# Patient Record
Sex: FEMALE | Race: WHITE | Employment: UNEMPLOYED | URBAN - METROPOLITAN AREA
[De-identification: names, ages, dates, MRNs, and addresses within clinical notes are randomized per-mention and may not be internally consistent; named-entity substitution may affect disease eponyms.]

---
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
## 2023-06-26 ENCOUNTER — HOSPITAL ENCOUNTER (OUTPATIENT)
Dept: ULTRASOUND IMAGING | Age: 18
Discharge: HOME | End: 2023-06-26
Payer: COMMERCIAL

## 2023-06-26 DIAGNOSIS — N63.11 MASS OF UPPER OUTER QUADRANT OF RIGHT BREAST: ICD-10-CM

## 2023-06-26 PROCEDURE — 76642 ULTRASOUND BREAST LIMITED: CPT

## 2023-06-27 ENCOUNTER — HOSPITAL ENCOUNTER (OUTPATIENT)
Dept: WOMENS IMAGING | Age: 18
Discharge: HOME | End: 2023-06-27
Payer: COMMERCIAL

## 2023-06-27 ENCOUNTER — HOSPITAL ENCOUNTER (OUTPATIENT)
Dept: WOMENS IMAGING | Age: 18
End: 2023-06-27
Payer: COMMERCIAL

## 2023-06-27 DIAGNOSIS — N63.11 MASS OF UPPER OUTER QUADRANT OF RIGHT BREAST: ICD-10-CM

## 2023-06-27 PROCEDURE — 19083 BX BREAST 1ST LESION US IMAG: CPT

## 2023-06-27 PROCEDURE — 88305 TISSUE EXAM BY PATHOLOGIST: CPT

## 2023-06-28 ENCOUNTER — TELEPHONE (OUTPATIENT)
Dept: WOMENS IMAGING | Age: 18
End: 2023-06-28

## 2024-10-26 ENCOUNTER — HOSPITAL ENCOUNTER (EMERGENCY)
Age: 19
Discharge: HOME | End: 2024-10-26
Payer: COMMERCIAL

## 2024-10-26 VITALS
WEIGHT: 136.2 LBS | DIASTOLIC BLOOD PRESSURE: 79 MMHG | HEART RATE: 76 BPM | RESPIRATION RATE: 16 BRPM | HEIGHT: 63 IN | TEMPERATURE: 98.8 F | SYSTOLIC BLOOD PRESSURE: 134 MMHG | OXYGEN SATURATION: 99 % | BODY MASS INDEX: 24.13 KG/M2

## 2024-10-26 DIAGNOSIS — Z34.90 EARLY STAGE OF PREGNANCY: Primary | ICD-10-CM

## 2024-10-26 LAB
ALBUMIN SERPL-MCNC: 5 G/DL (ref 3.4–5)
ALBUMIN/GLOB SERPL: 1.9 {RATIO} (ref 1.1–2.2)
ALP SERPL-CCNC: 50 U/L (ref 40–129)
ALT SERPL-CCNC: 11 U/L (ref 10–40)
ANION GAP SERPL CALCULATED.3IONS-SCNC: 15 MMOL/L (ref 3–16)
AST SERPL-CCNC: 21 U/L (ref 15–37)
B-HCG SERPL EIA 3RD IS-ACNC: 171 MIU/ML
BASOPHILS # BLD: 0.1 K/UL (ref 0–0.2)
BASOPHILS NFR BLD: 0.6 %
BILIRUB SERPL-MCNC: 0.5 MG/DL (ref 0–1)
BILIRUB UR QL STRIP.AUTO: NEGATIVE
BUN SERPL-MCNC: 9 MG/DL (ref 7–20)
CALCIUM SERPL-MCNC: 9.6 MG/DL (ref 8.3–10.6)
CHLORIDE SERPL-SCNC: 102 MMOL/L (ref 99–110)
CLARITY UR: CLEAR
CO2 SERPL-SCNC: 22 MMOL/L (ref 21–32)
COLOR UR: YELLOW
CREAT SERPL-MCNC: 0.7 MG/DL (ref 0.6–1.1)
DEPRECATED RDW RBC AUTO: 13.1 % (ref 12.4–15.4)
EOSINOPHIL # BLD: 0.1 K/UL (ref 0–0.6)
EOSINOPHIL NFR BLD: 0.9 %
GFR SERPLBLD CREATININE-BSD FMLA CKD-EPI: >90 ML/MIN/{1.73_M2}
GLUCOSE SERPL-MCNC: 84 MG/DL (ref 70–99)
GLUCOSE UR STRIP.AUTO-MCNC: NEGATIVE MG/DL
HCG UR QL: POSITIVE
HCT VFR BLD AUTO: 40.1 % (ref 36–48)
HGB BLD-MCNC: 13.3 G/DL (ref 12–16)
HGB UR QL STRIP.AUTO: NEGATIVE
KETONES UR STRIP.AUTO-MCNC: NEGATIVE MG/DL
LEUKOCYTE ESTERASE UR QL STRIP.AUTO: NEGATIVE
LYMPHOCYTES # BLD: 2.7 K/UL (ref 1–5.1)
LYMPHOCYTES NFR BLD: 25.5 %
MAGNESIUM SERPL-MCNC: 2.16 MG/DL (ref 1.8–2.4)
MCH RBC QN AUTO: 30.8 PG (ref 26–34)
MCHC RBC AUTO-ENTMCNC: 33.2 G/DL (ref 31–36)
MCV RBC AUTO: 92.8 FL (ref 80–100)
MONOCYTES # BLD: 0.8 K/UL (ref 0–1.3)
MONOCYTES NFR BLD: 7.4 %
NEUTROPHILS # BLD: 7 K/UL (ref 1.7–7.7)
NEUTROPHILS NFR BLD: 65.6 %
NITRITE UR QL STRIP.AUTO: NEGATIVE
PH UR STRIP.AUTO: 6.5 [PH] (ref 5–8)
PLATELET # BLD AUTO: 285 K/UL (ref 135–450)
PMV BLD AUTO: 8.2 FL (ref 5–10.5)
POTASSIUM SERPL-SCNC: 3.5 MMOL/L (ref 3.5–5.1)
PROT SERPL-MCNC: 7.7 G/DL (ref 6.4–8.2)
PROT UR STRIP.AUTO-MCNC: NEGATIVE MG/DL
RBC # BLD AUTO: 4.32 M/UL (ref 4–5.2)
SODIUM SERPL-SCNC: 139 MMOL/L (ref 136–145)
SP GR UR STRIP.AUTO: 1.02 (ref 1–1.03)
UA COMPLETE W REFLEX CULTURE PNL UR: NORMAL
UA DIPSTICK W REFLEX MICRO PNL UR: NORMAL
URN SPEC COLLECT METH UR: NORMAL
UROBILINOGEN UR STRIP-ACNC: 0.2 E.U./DL
WBC # BLD AUTO: 10.6 K/UL (ref 4–11)

## 2024-10-26 PROCEDURE — 80053 COMPREHEN METABOLIC PANEL: CPT

## 2024-10-26 PROCEDURE — 84703 CHORIONIC GONADOTROPIN ASSAY: CPT

## 2024-10-26 PROCEDURE — 85025 COMPLETE CBC W/AUTO DIFF WBC: CPT

## 2024-10-26 PROCEDURE — 81003 URINALYSIS AUTO W/O SCOPE: CPT

## 2024-10-26 PROCEDURE — 83735 ASSAY OF MAGNESIUM: CPT

## 2024-10-26 PROCEDURE — 36415 COLL VENOUS BLD VENIPUNCTURE: CPT

## 2024-10-26 PROCEDURE — 99283 EMERGENCY DEPT VISIT LOW MDM: CPT

## 2024-10-26 PROCEDURE — 84702 CHORIONIC GONADOTROPIN TEST: CPT

## 2024-10-26 ASSESSMENT — PAIN DESCRIPTION - DESCRIPTORS: DESCRIPTORS: CRAMPING

## 2024-10-26 ASSESSMENT — PAIN SCALES - GENERAL: PAINLEVEL_OUTOF10: 2

## 2024-10-26 ASSESSMENT — PAIN - FUNCTIONAL ASSESSMENT: PAIN_FUNCTIONAL_ASSESSMENT: 0-10

## 2024-10-26 ASSESSMENT — PAIN DESCRIPTION - ORIENTATION: ORIENTATION: OTHER (COMMENT)

## 2024-10-26 ASSESSMENT — PAIN DESCRIPTION - LOCATION: LOCATION: ABDOMEN

## 2024-10-26 NOTE — DISCHARGE INSTRUCTIONS
Please follow-up with your OB/GYN for further evaluation and treatment.  Your hCG today was 171, this indicates fairly early on in pregnancy.  This could be anywhere from 2 to 4 weeks.    If you develop any abdominal pain or significant vaginal bleeding please return the emergency department.

## 2024-10-26 NOTE — ED PROVIDER NOTES
[facility]  ED  EMERGENCY DEPARTMENT ENCOUNTER        Pt Name: Ana Ackerman  MRN: [id number]  Birthdate 2005  Date of evaluation: 10/26/2024  Provider: FANG Valdez  PCP: Karol Robertson APRN - NP  Note Started: 5:32 PM EDT 10/26/24      MARIZA. I have evaluated this patient.        CHIEF COMPLAINT       Chief Complaint   Patient presents with    Abdominal Pain     \"Slight cramping\" beginning this morning. States preg test positive a few days go. Denies any further symptoms       HISTORY OF PRESENT ILLNESS: 1 or more Elements     History from : Patient    Limitations to history : None    Ana Ackerman is a 19 y.o. female who presents to the emergency department complaining of abdominal pain.  Patient states she has had some slight abdominal cramping over the past several days.  She took a pregnancy test 2 days ago and it was positive.  She denies any abdominal pain at this time.  She has not had any vaginal bleeding.  No dysuria or hematuria.  She states she was on the Nexplanon birth control until July.  She did not have a normal menstrual cycle after having that removed.  She states she had a little bit of vaginal spotting in September but states it was not quite as heavy as her normal menstrual cycle.  Denies any fevers or chills.  No nausea or vomiting.  No dysuria or hematuria.  No diarrhea.  She is here requesting an ultrasound.  She is currently taking a prenatal vitamin.    Nursing Notes were all reviewed and agreed with or any disagreements were addressed in the HPI.    REVIEW OF SYSTEMS :      Review of Systems    Positives and Pertinent negatives as per HPI.     SURGICAL HISTORY     Past Surgical History:   Procedure Laterality Date    TONSILLECTOMY      TYMPANOSTOMY TUBE PLACEMENT      US BREAST BIOPSY W LOC DEVICE 1ST LESION RIGHT Right 6/27/2023    US BREAST BIOPSY W LOC DEVICE 1ST LESION RIGHT 6/27/2023 Taco Cross MD [facility]

## 2024-10-28 ENCOUNTER — HOSPITAL ENCOUNTER (OUTPATIENT)
Age: 19
Discharge: HOME | End: 2024-10-28
Payer: COMMERCIAL

## 2024-10-28 DIAGNOSIS — O36.80X0 PREGNANCY OF UNKNOWN ANATOMIC LOCATION: Primary | ICD-10-CM

## 2024-10-28 LAB — B-HCG SERPL EIA 3RD IS-ACNC: 484.6 MIU/ML

## 2024-10-28 PROCEDURE — 84702 CHORIONIC GONADOTROPIN TEST: CPT

## 2024-10-28 PROCEDURE — 36415 COLL VENOUS BLD VENIPUNCTURE: CPT

## 2024-10-31 LAB
ABO, EXTERNAL RESULT: NORMAL
HEP B, EXTERNAL RESULT: NEGATIVE
HIV, EXTERNAL RESULT: NEGATIVE
RH FACTOR, EXTERNAL RESULT: POSITIVE
RPR, EXTERNAL RESULT: NONREACTIVE
RUBELLA TITER, EXTERNAL RESULT: NORMAL

## 2024-11-27 LAB
C. TRACHOMATIS, EXTERNAL RESULT: NEGATIVE
N. GONORRHOEAE, EXTERNAL RESULT: NEGATIVE

## 2025-01-03 ENCOUNTER — HOSPITAL ENCOUNTER (EMERGENCY)
Age: 20
Discharge: HOME | End: 2025-01-03
Payer: COMMERCIAL

## 2025-01-03 VITALS
HEIGHT: 63 IN | DIASTOLIC BLOOD PRESSURE: 82 MMHG | TEMPERATURE: 98 F | WEIGHT: 143.25 LBS | RESPIRATION RATE: 16 BRPM | BODY MASS INDEX: 25.38 KG/M2 | HEART RATE: 89 BPM | OXYGEN SATURATION: 100 % | SYSTOLIC BLOOD PRESSURE: 127 MMHG

## 2025-01-03 DIAGNOSIS — Z3A.01 4 WEEKS GESTATION OF PREGNANCY: ICD-10-CM

## 2025-01-03 DIAGNOSIS — F41.1 ANXIETY STATE: Primary | ICD-10-CM

## 2025-01-03 LAB
BACTERIA URNS QL MICRO: ABNORMAL /HPF
BILIRUB UR QL STRIP.AUTO: NEGATIVE
CLARITY UR: CLEAR
COLOR UR: YELLOW
EPI CELLS #/AREA URNS HPF: ABNORMAL /HPF (ref 0–5)
GLUCOSE UR STRIP.AUTO-MCNC: NEGATIVE MG/DL
HGB UR QL STRIP.AUTO: NEGATIVE
KETONES UR STRIP.AUTO-MCNC: NEGATIVE MG/DL
LEUKOCYTE ESTERASE UR QL STRIP.AUTO: ABNORMAL
NITRITE UR QL STRIP.AUTO: NEGATIVE
PH UR STRIP.AUTO: 7 [PH] (ref 5–8)
PROT UR STRIP.AUTO-MCNC: NEGATIVE MG/DL
RBC #/AREA URNS HPF: ABNORMAL /HPF (ref 0–4)
SP GR UR STRIP.AUTO: 1.01 (ref 1–1.03)
UA COMPLETE W REFLEX CULTURE PNL UR: ABNORMAL
UA DIPSTICK W REFLEX MICRO PNL UR: YES
URN SPEC COLLECT METH UR: ABNORMAL
UROBILINOGEN UR STRIP-ACNC: 0.2 E.U./DL
WBC #/AREA URNS HPF: ABNORMAL /HPF (ref 0–5)

## 2025-01-03 PROCEDURE — 81003 URINALYSIS AUTO W/O SCOPE: CPT

## 2025-01-03 PROCEDURE — 99283 EMERGENCY DEPT VISIT LOW MDM: CPT

## 2025-01-03 PROCEDURE — 81001 URINALYSIS AUTO W/SCOPE: CPT

## 2025-01-03 RX ORDER — CEPHALEXIN 500 MG/1
500 CAPSULE ORAL 3 TIMES DAILY
Qty: 15 CAPSULE | Refills: 0 | Status: SHIPPED | OUTPATIENT
Start: 2025-01-03 | End: 2025-01-08

## 2025-01-03 ASSESSMENT — PAIN - FUNCTIONAL ASSESSMENT: PAIN_FUNCTIONAL_ASSESSMENT: NONE - DENIES PAIN

## 2025-01-04 NOTE — ED PROVIDER NOTES
[facility]  ED  EMERGENCY DEPARTMENT ENCOUNTER        Pt Name: Ana Ackerman  MRN: [id number]  Birthdate 2005  Date of evaluation: 1/3/2025  Provider: FANG Valdez  PCP: Karol Robertson APRN - NP  Note Started: 9:55 PM EST 1/3/25      MARIZA. I have evaluated this patient.        CHIEF COMPLAINT       Chief Complaint   Patient presents with    Other     States 14 weeks pregnant with first baby   Reports having an anxiety attack states \"I am just worried that my baby isn't ok\"  Denies pain, denies vaginal bleeding   States otherwise feels great with no complaints and no reasoning as to why baby would not be ok   Last ultrasound 6 weeks gestation, did have bleeding early pregnancy that has since resolved        HISTORY OF PRESENT ILLNESS: 1 or more Elements     History from : Patient    Limitations to history : None    Ana Ackerman is a 19 y.o. female who presents to the emergency department for anxiety.  Patient states she is 14 weeks pregnant, this is her first pregnancy.  She reports having an anxiety attack stating she is worried about her baby.  She denies any history of anxiety.  Denies any abdominal pain, dysuria, vaginal bleeding or discharge.  Her last ultrasound was 6 weeks gestation which confirmed pregnancy.  She follows with [facility].    Nursing Notes were all reviewed and agreed with or any disagreements were addressed in the HPI.    REVIEW OF SYSTEMS :      Review of Systems    Positives and Pertinent negatives as per HPI.     SURGICAL HISTORY     Past Surgical History:   Procedure Laterality Date    TONSILLECTOMY      TYMPANOSTOMY TUBE PLACEMENT      US BREAST BIOPSY W LOC DEVICE 1ST LESION RIGHT Right 6/27/2023    US BREAST BIOPSY W LOC DEVICE 1ST LESION RIGHT 6/27/2023 Taco Cross MD [facility]       CURRENTMEDICATIONS       Discharge Medication List as of 1/3/2025 10:01 PM          ALLERGIES     Patient has no known

## 2025-04-22 ENCOUNTER — HOSPITAL ENCOUNTER (OUTPATIENT)
Age: 20
Discharge: HOME | End: 2025-04-22
Attending: OBSTETRICS & GYNECOLOGY | Admitting: OBSTETRICS & GYNECOLOGY
Payer: COMMERCIAL

## 2025-04-22 VITALS
RESPIRATION RATE: 16 BRPM | OXYGEN SATURATION: 98 % | WEIGHT: 171.7 LBS | BODY MASS INDEX: 30.42 KG/M2 | HEART RATE: 100 BPM | TEMPERATURE: 98.1 F | SYSTOLIC BLOOD PRESSURE: 114 MMHG | DIASTOLIC BLOOD PRESSURE: 74 MMHG

## 2025-04-22 PROCEDURE — 99211 OFF/OP EST MAY X REQ PHY/QHP: CPT

## 2025-04-23 NOTE — PROGRESS NOTES
D/c instructions given. Pt verbalized understanding and denied questions. Pt left OB unit @ 2032 in stable condition, ambulatory and undelivered. Not in active labor.

## 2025-04-23 NOTE — PROGRESS NOTES
Pt presents to T1 with primary complaint of DFM since 1030 this morning. Pt attempted to reposition, drink water and juice throughout the day. Pt denies any contractions at this time. Pt denies LOF and vaginal bleeding. Pt placed on EFM and vital signs obtained and WNL. Kick counter given to pt. Will continue to monitor and notify Dr. Flores.

## 2025-04-23 NOTE — H&P
[facility]  Labor and Delivery Triage Note    PRENATAL PROVIDER GROUP: [facility]    PRESENTING HISTORY:  Ana Ackerman is a 20 y.o. y.o.  at 30w1d who presents with complaint of decreased fetal movement since this AM.  Usual method to stimulate baby did not work today.  No vaginal bleeding, no contractions, no leakage of fluid.     DATA:  /74   Pulse 100   Temp 98.1 °F (36.7 °C) (Oral)   Resp 16   Wt 77.9 kg (171 lb 11.2 oz)   LMP 2024 (Approximate)   SpO2 98%   BMI 30.42 kg/m²   CONSTITUTIONAL:  awake, alert, cooperative, no apparent distress, and appears stated age  LUNGS:  no increased work of breathing  ABDOMEN:  gravid, nontender    NST: reassuring for gestational age  Fetal heart rate:         Baseline Heart Rate:  135        Accelerations:  appropriate for gestational age       Decelerations:  none       Variability:  moderate    Contraction frequency: none    ASSESSMENT AND PLAN:       pregnancy with decreased fetal movement- resolved during NST.  Patient is reassured and does not desire additional evaluation    Condition: good    Disposition: home, follow up at next prenatal visit, continue fetal kick counts      Payton Flores MD  [facility]  P: [phone] ([place])  P: [phone] ([place])  P: [phone] (Chip Griffiths)  [email or web address]

## 2025-06-10 LAB — GBS, EXTERNAL RESULT: NEGATIVE

## 2025-06-14 ENCOUNTER — HOSPITAL ENCOUNTER (OUTPATIENT)
Age: 20
Discharge: HOME | End: 2025-06-14
Attending: OBSTETRICS & GYNECOLOGY | Admitting: OBSTETRICS & GYNECOLOGY
Payer: COMMERCIAL

## 2025-06-14 VITALS
HEART RATE: 100 BPM | DIASTOLIC BLOOD PRESSURE: 73 MMHG | RESPIRATION RATE: 16 BRPM | TEMPERATURE: 98.1 F | WEIGHT: 180 LBS | SYSTOLIC BLOOD PRESSURE: 136 MMHG | OXYGEN SATURATION: 98 % | BODY MASS INDEX: 31.89 KG/M2

## 2025-06-14 DIAGNOSIS — R82.71 ASYMPTOMATIC BACTERIURIA DURING PREGNANCY IN THIRD TRIMESTER: Primary | ICD-10-CM

## 2025-06-14 DIAGNOSIS — O98.819 CANDIDIASIS OF VAGINA DURING PREGNANCY: ICD-10-CM

## 2025-06-14 DIAGNOSIS — B37.31 CANDIDIASIS OF VAGINA DURING PREGNANCY: ICD-10-CM

## 2025-06-14 DIAGNOSIS — O99.891 ASYMPTOMATIC BACTERIURIA DURING PREGNANCY IN THIRD TRIMESTER: Primary | ICD-10-CM

## 2025-06-14 PROBLEM — O36.8130 DECREASED FETAL MOVEMENT AFFECTING MANAGEMENT OF PREGNANCY IN THIRD TRIMESTER: Status: ACTIVE | Noted: 2025-06-14

## 2025-06-14 LAB
ALBUMIN SERPL-MCNC: 3.6 G/DL (ref 3.4–5)
ALBUMIN/GLOB SERPL: 1.3 {RATIO} (ref 1.1–2.2)
ALP SERPL-CCNC: 139 U/L (ref 40–129)
ALT SERPL-CCNC: 10 U/L (ref 10–40)
ANION GAP SERPL CALCULATED.3IONS-SCNC: 14 MMOL/L (ref 3–16)
AST SERPL-CCNC: 17 U/L (ref 15–37)
BACTERIA URNS QL MICRO: ABNORMAL /HPF
BILIRUB SERPL-MCNC: <0.2 MG/DL (ref 0–1)
BILIRUB UR QL STRIP.AUTO: NEGATIVE
BUN SERPL-MCNC: 6 MG/DL (ref 7–20)
CALCIUM SERPL-MCNC: 9.5 MG/DL (ref 8.3–10.6)
CHLORIDE SERPL-SCNC: 103 MMOL/L (ref 99–110)
CLARITY UR: ABNORMAL
CO2 SERPL-SCNC: 20 MMOL/L (ref 21–32)
COLOR UR: YELLOW
CREAT SERPL-MCNC: 0.5 MG/DL (ref 0.6–1.1)
DEPRECATED RDW RBC AUTO: 13.6 % (ref 12.4–15.4)
EPI CELLS #/AREA URNS HPF: ABNORMAL /HPF (ref 0–5)
GFR SERPLBLD CREATININE-BSD FMLA CKD-EPI: >90 ML/MIN/{1.73_M2}
GLUCOSE SERPL-MCNC: 131 MG/DL (ref 70–99)
GLUCOSE UR STRIP.AUTO-MCNC: NEGATIVE MG/DL
HCT VFR BLD AUTO: 36.4 % (ref 36–48)
HGB BLD-MCNC: 12.4 G/DL (ref 12–16)
HGB UR QL STRIP.AUTO: NEGATIVE
KETONES UR STRIP.AUTO-MCNC: 15 MG/DL
LEUKOCYTE ESTERASE UR QL STRIP.AUTO: ABNORMAL
MCH RBC QN AUTO: 30.1 PG (ref 26–34)
MCHC RBC AUTO-ENTMCNC: 34 G/DL (ref 31–36)
MCV RBC AUTO: 88.6 FL (ref 80–100)
NITRITE UR QL STRIP.AUTO: NEGATIVE
PH UR STRIP.AUTO: 7 [PH] (ref 5–8)
PLATELET # BLD AUTO: 185 K/UL (ref 135–450)
PMV BLD AUTO: 9.6 FL (ref 5–10.5)
POTASSIUM SERPL-SCNC: 3.5 MMOL/L (ref 3.5–5.1)
PROT SERPL-MCNC: 6.3 G/DL (ref 6.4–8.2)
PROT UR STRIP.AUTO-MCNC: NEGATIVE MG/DL
RBC # BLD AUTO: 4.11 M/UL (ref 4–5.2)
RBC #/AREA URNS HPF: ABNORMAL /HPF (ref 0–4)
SODIUM SERPL-SCNC: 137 MMOL/L (ref 136–145)
SP GR UR STRIP.AUTO: 1.01 (ref 1–1.03)
UA DIPSTICK W REFLEX MICRO PNL UR: YES
URN SPEC COLLECT METH UR: ABNORMAL
UROBILINOGEN UR STRIP-ACNC: 0.2 E.U./DL
WBC # BLD AUTO: 12.5 K/UL (ref 4–11)
WBC #/AREA URNS HPF: ABNORMAL /HPF (ref 0–5)

## 2025-06-14 PROCEDURE — 85027 COMPLETE CBC AUTOMATED: CPT

## 2025-06-14 PROCEDURE — 81001 URINALYSIS AUTO W/SCOPE: CPT

## 2025-06-14 PROCEDURE — 99212 OFFICE O/P EST SF 10 MIN: CPT

## 2025-06-14 PROCEDURE — 80053 COMPREHEN METABOLIC PANEL: CPT

## 2025-06-14 RX ORDER — TERCONAZOLE 4 MG/G
CREAM VAGINAL
Qty: 3 EACH | Refills: 0 | Status: SHIPPED | OUTPATIENT
Start: 2025-06-14 | End: 2025-06-21

## 2025-06-14 RX ORDER — NITROFURANTOIN 25; 75 MG/1; MG/1
100 CAPSULE ORAL 2 TIMES DAILY
Qty: 10 CAPSULE | Refills: 0 | Status: SHIPPED | OUTPATIENT
Start: 2025-06-14 | End: 2025-06-19

## 2025-06-14 NOTE — H&P
Department of Obstetrics and Gynecology  Labor and Delivery  Attending Triage Note      CHIEF COMPLAINT: decreased fetal movement    HPI:  The patient is a 20 year old female at 37 weeks 5 days EDC 6/3/25 presents for decreased fetal movement since yesterday.  Did not call office for evaluation, she states \"I thought maybe the baby was just having a quiet day\".  She felt the baby move 3 times around 4am, at breakfast at 730 but has not felt baby move. No vaginal bleeding, leakage of fluid or contractions. Receives prenatal care with [facility].       OB History          1    Para        Term                AB        Living    0         SAB        IAB        Ectopic        Molar        Multiple        Live Births               Vitals:  LMP 2024 (Approximate)     GEN:  awake, alert, cooperative, no apparent distress, and appears stated age  CV +S1,S2  Resp no labored breathing  ABD:  Soft, gravid, non tender  SVE: no pooling on valsalva, cervical ectropion, small bleed from trauma to cervix on exam from speculum, white/yellow curdy discharge concerning for vaginal candidiasis.    Cervix:  C/L/H    Fetal heart rate:         Baseline Heart Rate:  130 bpm        Accelerations:  present       Decelerations:  absent       Variability:  moderate    Contraction frequency: not cecy      ASSESSMENT & PLAN:    IUP at 37.5 weeks    Decreased Fetal movement  Vaginal candidiasis  Asymptomatic bacteriuria  Reassuring fetal status    NST reactive. FHR Baseline 130 bpm wnl. Now feeling good fetal  movement. FHT reassuring and reviewed at bedside with patient. She reports good active movement at this time.  Erx for Terconazole for candidiasis x3 qhs.    Encourage po hydration.  Erx for Macrobid BID x5d.    Okay for discharge home. Stable.  Follow up next week as scheduled on 25 for routine OB care. Call/return precautions reviewed.      Rebekah Alcala, DO  OB/GYN

## 2025-06-14 NOTE — PROGRESS NOTES
at 36.4 weeks gestation arrives in triage with report of decreased fetal activity.  Had an episode of nausea and vomiting yesterday with abdominal cramping.  Also reports that vaginal discharge has become greenish/yellowish.  EFM placed.  Patient instructed to use remote marker to indicate fetal movement.

## 2025-06-14 NOTE — DISCHARGE INSTRUCTIONS
Diet/Activity/Restrictions:  Regular  Limit caffeine consumption  Increase your fluid intake  Eat small meals-but often.  Rise from laying/sitting position to standing slowly.  Avoid laying on your back, sidelying positions are best.  Weigh yourself daily and write down what you weigh.  If you had a vaginal exam you may have some bloody mucous or brown vaginal discharge.  If your doctor/midwife has ordered antibiotics, get it filled right away and take all of the medication as it has been prescribed.    When to call your doctor:  If you have severe back pain.  Period-like cramps that may come and go.  May feel like baby is balling up.  Low, dull backache, not relieved by rest.  Pressure in your vagina or lower abdomen that may feel like the baby is pushing down.  Change in the type or amount of vaginal discharge.  Abdominal cramps that may be accompanied by diarrhea.  4-5 uterine contractions in one hour.  Fluid leaking from your vagina (may or may not be bloody)  If you have vaginal bleeding.  If you have a temperature of 100.6 or more.  If your baby has a decrease in activity.  Less than 5 times in an hour.  If you feel you are not getting better or you are concerned.  If you develop a headache, not resolved with your usual interventions.  If you have changes in your vision (blurring or spots in your vision).  If you have burning with urination, urgency or frequency.  If you have pain in your abdomen, up by your ribs.    General Instructions:    Nausea and Vomiting  Keep dry toast/crackers with you to munch on  Eat small frequent meals  Try to keep something in your stomach (don't let your stomach get empty)  Take your time getting up  Avoid smells that make you feel sick    Travel  Wear seatbelts or safety/lap belts  Walk frequently, every 1-2 hours  Wear clothing that does not constrict and comfortable shoes  Keep a light snack (e.g. Dry crackers) with you at all times to prevent nausea  Drink plenty of water,

## 2025-06-14 NOTE — PROGRESS NOTES
Patient presents to [facility] with c/o of decreased FM and one episode of vomiting/cramping.    She states when she woke up at 0430 the baby wasn't as active as he normally was. She moved around in an attempt to get him to move more and when she ate some potato chips she threw up with no further increase in fetal movement.    She was escorted to triage room 3 and asked to leave a urine sample if possible.     Upon assessment:    Patient states she has had no LOF or vaginal bleeding. She has had intermittent cramps but nothing that is persistent or too painful.  Patient has felt baby move every once in a while but not as often as normal.    To touch, abdomen is soft.    Dr. Alcala was updated and has ordered a UA, CBC and CMP.

## 2025-06-14 NOTE — PROGRESS NOTES
Pt verbalized understanding of verbal and written discharge instructions and denies having questions at this time. Pt left OB unit at 1349 ambulatory, undelivered, and in stable condition, accompanied by mom. Patient is not in active labor.

## 2025-06-30 ENCOUNTER — ANESTHESIA (OUTPATIENT)
Dept: LABOR AND DELIVERY | Age: 20
DRG: 560 | End: 2025-06-30
Payer: COMMERCIAL

## 2025-06-30 ENCOUNTER — HOSPITAL ENCOUNTER (INPATIENT)
Age: 20
LOS: 2 days | Discharge: HOME | DRG: 560 | End: 2025-07-02
Attending: OBSTETRICS & GYNECOLOGY | Admitting: OBSTETRICS & GYNECOLOGY
Payer: COMMERCIAL

## 2025-06-30 ENCOUNTER — ANESTHESIA EVENT (OUTPATIENT)
Dept: LABOR AND DELIVERY | Age: 20
DRG: 560 | End: 2025-06-30
Payer: COMMERCIAL

## 2025-06-30 LAB
ABO/RH: NORMAL
AMPHETAMINES UR QL SCN>1000 NG/ML: NORMAL
ANTIBODY SCREEN: NORMAL
BARBITURATES UR QL SCN>200 NG/ML: NORMAL
BASOPHILS # BLD: 0 K/UL (ref 0–0.2)
BASOPHILS NFR BLD: 0.4 %
BENZODIAZ UR QL SCN>200 NG/ML: NORMAL
BUPRENORPHINE+NOR UR QL SCN: NORMAL
CANNABINOIDS UR QL SCN>50 NG/ML: NORMAL
COCAINE UR QL SCN: NORMAL
DEPRECATED RDW RBC AUTO: 14.2 % (ref 12.4–15.4)
DRUG SCREEN COMMENT UR-IMP: NORMAL
EOSINOPHIL # BLD: 0 K/UL (ref 0–0.6)
EOSINOPHIL NFR BLD: 0.4 %
FENTANYL SCREEN, URINE: NORMAL
HGB BLD-MCNC: 12.4 G/DL (ref 12–16)
LYMPHOCYTES # BLD: 2.1 K/UL (ref 1–5.1)
LYMPHOCYTES NFR BLD: 16.2 %
MCH RBC QN AUTO: 30.6 PG (ref 26–34)
MCV RBC AUTO: 88.5 FL (ref 80–100)
METHADONE UR QL SCN>300 NG/ML: NORMAL
MONOCYTES # BLD: 0.8 K/UL (ref 0–1.3)
MONOCYTES NFR BLD: 6.1 %
NEUTROPHILS # BLD: 9.8 K/UL (ref 1.7–7.7)
NEUTROPHILS NFR BLD: 76.9 %
OPIATES UR QL SCN>300 NG/ML: NORMAL
OXYCODONE UR QL SCN: NORMAL
PCP UR QL SCN>25 NG/ML: NORMAL
PH UR STRIP: 7.5 [PH]
PMV BLD AUTO: 10.3 FL (ref 5–10.5)
RBC # BLD AUTO: 4.06 M/UL (ref 4–5.2)

## 2025-06-30 PROCEDURE — 86780 TREPONEMA PALLIDUM: CPT

## 2025-06-30 PROCEDURE — 85025 COMPLETE CBC W/AUTO DIFF WBC: CPT

## 2025-06-30 PROCEDURE — 2580000003 HC RX 258: Performed by: OBSTETRICS & GYNECOLOGY

## 2025-06-30 PROCEDURE — 80307 DRUG TEST PRSMV CHEM ANLYZR: CPT

## 2025-06-30 PROCEDURE — 2500000003 HC RX 250 WO HCPCS: Performed by: NURSE ANESTHETIST, CERTIFIED REGISTERED

## 2025-06-30 PROCEDURE — 86850 RBC ANTIBODY SCREEN: CPT

## 2025-06-30 PROCEDURE — 1220000000 HC SEMI PRIVATE OB R&B

## 2025-06-30 PROCEDURE — 6360000002 HC RX W HCPCS: Performed by: NURSE ANESTHETIST, CERTIFIED REGISTERED

## 2025-06-30 PROCEDURE — 86900 BLOOD TYPING SEROLOGIC ABO: CPT

## 2025-06-30 PROCEDURE — 6360000002 HC RX W HCPCS: Performed by: OBSTETRICS & GYNECOLOGY

## 2025-06-30 PROCEDURE — 86901 BLOOD TYPING SEROLOGIC RH(D): CPT

## 2025-06-30 RX ORDER — SODIUM CHLORIDE 9 MG/ML
25 INJECTION, SOLUTION INTRAVENOUS PRN
Status: DISCONTINUED | OUTPATIENT
Start: 2025-06-30 | End: 2025-07-01

## 2025-06-30 RX ORDER — SODIUM CHLORIDE 0.9 % (FLUSH) 0.9 %
5-40 SYRINGE (ML) INJECTION PRN
Status: DISCONTINUED | OUTPATIENT
Start: 2025-06-30 | End: 2025-07-01

## 2025-06-30 RX ORDER — BUPIVACAINE HYDROCHLORIDE 2.5 MG/ML
INJECTION, SOLUTION EPIDURAL; INFILTRATION; INTRACAUDAL; PERINEURAL
Status: DISCONTINUED | OUTPATIENT
Start: 2025-06-30 | End: 2025-07-01 | Stop reason: SDUPTHER

## 2025-06-30 RX ORDER — NALBUPHINE HYDROCHLORIDE 10 MG/ML
10 INJECTION INTRAMUSCULAR; INTRAVENOUS; SUBCUTANEOUS
Status: DISCONTINUED | OUTPATIENT
Start: 2025-06-30 | End: 2025-07-01

## 2025-06-30 RX ORDER — TRANEXAMIC ACID 10 MG/ML
1000 INJECTION, SOLUTION INTRAVENOUS
Status: DISCONTINUED | OUTPATIENT
Start: 2025-06-30 | End: 2025-07-01

## 2025-06-30 RX ORDER — METHYLERGONOVINE MALEATE 0.2 MG/ML
200 INJECTION INTRAVENOUS PRN
Status: DISCONTINUED | OUTPATIENT
Start: 2025-06-30 | End: 2025-07-01

## 2025-06-30 RX ORDER — SODIUM CHLORIDE, SODIUM LACTATE, POTASSIUM CHLORIDE, AND CALCIUM CHLORIDE .6; .31; .03; .02 G/100ML; G/100ML; G/100ML; G/100ML
500 INJECTION, SOLUTION INTRAVENOUS PRN
Status: DISCONTINUED | OUTPATIENT
Start: 2025-06-30 | End: 2025-07-01

## 2025-06-30 RX ORDER — CARBOPROST TROMETHAMINE 250 UG/ML
250 INJECTION, SOLUTION INTRAMUSCULAR PRN
Status: DISCONTINUED | OUTPATIENT
Start: 2025-06-30 | End: 2025-07-01

## 2025-06-30 RX ORDER — TERBUTALINE SULFATE 1 MG/ML
0.25 INJECTION SUBCUTANEOUS
Status: DISCONTINUED | OUTPATIENT
Start: 2025-06-30 | End: 2025-07-01

## 2025-06-30 RX ORDER — MISOPROSTOL 100 UG/1
800 TABLET ORAL PRN
Status: DISCONTINUED | OUTPATIENT
Start: 2025-06-30 | End: 2025-07-01

## 2025-06-30 RX ORDER — SODIUM CHLORIDE 0.9 % (FLUSH) 0.9 %
5-40 SYRINGE (ML) INJECTION EVERY 12 HOURS SCHEDULED
Status: DISCONTINUED | OUTPATIENT
Start: 2025-06-30 | End: 2025-07-01

## 2025-06-30 RX ORDER — ONDANSETRON 2 MG/ML
4 INJECTION INTRAMUSCULAR; INTRAVENOUS EVERY 6 HOURS PRN
Status: DISCONTINUED | OUTPATIENT
Start: 2025-06-30 | End: 2025-07-01

## 2025-06-30 RX ORDER — BUTORPHANOL TARTRATE 1 MG/ML
1 INJECTION, SOLUTION INTRAMUSCULAR; INTRAVENOUS
Status: DISCONTINUED | OUTPATIENT
Start: 2025-06-30 | End: 2025-07-01

## 2025-06-30 RX ORDER — SODIUM CHLORIDE, SODIUM LACTATE, POTASSIUM CHLORIDE, CALCIUM CHLORIDE 600; 310; 30; 20 MG/100ML; MG/100ML; MG/100ML; MG/100ML
INJECTION, SOLUTION INTRAVENOUS CONTINUOUS
Status: DISCONTINUED | OUTPATIENT
Start: 2025-06-30 | End: 2025-07-01

## 2025-06-30 RX ORDER — ONDANSETRON 4 MG/1
4 TABLET, ORALLY DISINTEGRATING ORAL EVERY 8 HOURS PRN
Status: DISCONTINUED | OUTPATIENT
Start: 2025-06-30 | End: 2025-07-01

## 2025-06-30 RX ADMIN — Medication 1 MILLI-UNITS/MIN: at 15:04

## 2025-06-30 RX ADMIN — SODIUM CHLORIDE, SODIUM LACTATE, POTASSIUM CHLORIDE, AND CALCIUM CHLORIDE: .6; .31; .03; .02 INJECTION, SOLUTION INTRAVENOUS at 11:19

## 2025-06-30 RX ADMIN — ONDANSETRON 4 MG: 2 INJECTION, SOLUTION INTRAMUSCULAR; INTRAVENOUS at 18:41

## 2025-06-30 RX ADMIN — SODIUM CHLORIDE, SODIUM LACTATE, POTASSIUM CHLORIDE, AND CALCIUM CHLORIDE: .6; .31; .03; .02 INJECTION, SOLUTION INTRAVENOUS at 15:05

## 2025-06-30 RX ADMIN — Medication 15 ML/HR: at 18:28

## 2025-06-30 RX ADMIN — BUPIVACAINE HYDROCHLORIDE 1 ML: 2.5 INJECTION, SOLUTION EPIDURAL; INFILTRATION; INTRACAUDAL; PERINEURAL at 18:17

## 2025-06-30 ASSESSMENT — LIFESTYLE VARIABLES: SMOKING_STATUS: 1

## 2025-06-30 NOTE — ANESTHESIA PRE PROCEDURE
Department of Anesthesiology  Preprocedure Note       Name:  Ana Ackerman   Age:  20 y.o.  :  2005                                          MRN:  [id number]         Date:  2025      Surgeon: * Surgery not found *    Procedure:     Medications prior to admission:   Prior to Admission medications    Medication Sig Start Date End Date Taking? Authorizing Provider   Prenatal MV-Min-Fe Fum-FA-DHA (PRENATAL 1 PO) Take 1 tablet by mouth daily   Yes Provider, MD Osvaldo       Current medications:    Current Facility-Administered Medications   Medication Dose Route Frequency Provider Last Rate Last Admin    lactated ringers infusion   IntraVENous Continuous Amber Burleson,  mL/hr at 25 1505 New Bag at 25 1505    lactated ringers bolus 500 mL  500 mL IntraVENous PRN Amber Burleson, DO        Or    lactated ringers bolus 500 mL  500 mL IntraVENous PRN Amber Burleson, DO        sodium chloride flush 0.9 % injection 5-40 mL  5-40 mL IntraVENous 2 times per day Amber Burleson, DO        sodium chloride flush 0.9 % injection 5-40 mL  5-40 mL IntraVENous PRN Amber Burleson, DO        0.9 % sodium chloride infusion  25 mL IntraVENous PRN Amber Burleson, DO        methylergonovine (METHERGINE) injection 200 mcg  200 mcg IntraMUSCular PRN Amber Burleson, DO        carboprost (HEMABATE) injection 250 mcg  250 mcg IntraMUSCular PRN Amber Burleson, DO        miSOPROStol (CYTOTEC) tablet 800 mcg  800 mcg Rectal PRN Amber Burleson, DO        tranexamic acid-NaCl IVPB premix 1,000 mg  1,000 mg IntraVENous Once PRN Amber Burleson, DO        oxytocin (PITOCIN) 30 units in 500 mL infusion  87.3 claus-units/min IntraVENous Continuous PRN Amber Burleson,         And    oxytocin (PITOCIN) 10 unit bolus from the bag  10 Units IntraVENous PRN Amber Burleson, DO        terbutaline (BRETHINE) injection 0.25 mg  0.25 mg SubCUTAneous Once PRN Amber Burleson, DO

## 2025-06-30 NOTE — ANESTHESIA PROCEDURE NOTES
CSE Block    Patient location during procedure: OB  Start time: 6/30/2025 8:12 AM  End time: 6/30/2025 6:28 PM  Reason for block: labor epidural  Staffing  Performed: resident/CRNA   Resident/CRNA: Mercy Leonard APRN - CRNA  Performed by: Mercy Leonard APRN - CRNA  Authorized by: Mercy Leonard APRN - CRNA    CSE  Patient position: sitting  Prep: ChloraPrep and site prepped and draped  Patient monitoring: continuous pulse ox and frequent blood pressure checks  Approach: midline  Provider prep: mask and sterile gloves  Spinal Needle  Needle type: pencil-tip   Needle gauge: 25 G  Needle length: 4.75 in  Epidural Needle  Injection technique: LEONIDES saline  Needle type: Tuohy   Needle gauge: 17 G  Needle length: 3.5 in  Needle insertion depth: 7 cm  Location: lumbar (1-5)  Catheter  Catheter type: side hole  Catheter size: 19 G  Catheter at skin depth: 12 cm  Test dose: negative (3ml of lidocaine 1.5% + epi 1:200k)  Assessment  Hemodynamics: stable  Additional Notes  Patient in sitting position.  Sterile prep and drape applied to back.  Skin localization with 5ml of lidocaine 1%.  LORT with NS, CSE with 25g pencan- return of clear free flowing csf.  Epidural catheter advanced easily.  Test dose given incrementally after negative aspirate from catheter.  Sterile dressing applied.  Patient tolerated procedure well.  Preanesthetic Checklist  Completed: patient identified, IV checked, site marked, risks and benefits discussed, surgical/procedural consents, equipment checked, pre-op evaluation, timeout performed, anesthesia consent given, oxygen available, monitors applied/VS acknowledged and blood product R/B/A discussed and consented

## 2025-06-30 NOTE — PLAN OF CARE
Problem: Pain  Goal: Verbalizes/displays adequate comfort level or baseline comfort level  2025 by Cecilia Dumont RN  Outcome: Progressing  2025 by Kenna Godwin RN  Outcome: Progressing     Problem: Safety - Adult  Goal: Free from fall injury  2025 by Cecilia Dumont RN  Outcome: Progressing  2025 by Kenna Godwin RN  Outcome: Progressing     Problem: Vaginal Birth or  Section  Goal: Fetal and maternal status remain reassuring during the birth process  Description:  Birth OB-Pregnancy care plan goal which identifies if the fetal and maternal status remain reassuring during the birth process  2025 by Cecilia Dumont RN  Outcome: Progressing  2025 by Kenna Godwin RN  Outcome: Progressing

## 2025-07-01 PROBLEM — O99.213 OBESITY AFFECTING PREGNANCY IN THIRD TRIMESTER: Status: ACTIVE | Noted: 2025-07-01

## 2025-07-01 LAB — REAGIN+T PALLIDUM IGG+IGM SERPL-IMP: NORMAL

## 2025-07-01 PROCEDURE — 0KQM0ZZ REPAIR PERINEUM MUSCLE, OPEN APPROACH: ICD-10-PCS | Performed by: OBSTETRICS & GYNECOLOGY

## 2025-07-01 PROCEDURE — 1220000000 HC SEMI PRIVATE OB R&B

## 2025-07-01 PROCEDURE — 6360000002 HC RX W HCPCS: Performed by: OBSTETRICS & GYNECOLOGY

## 2025-07-01 PROCEDURE — 7200000001 HC VAGINAL DELIVERY

## 2025-07-01 PROCEDURE — 6370000000 HC RX 637 (ALT 250 FOR IP): Performed by: OBSTETRICS & GYNECOLOGY

## 2025-07-01 PROCEDURE — 51701 INSERT BLADDER CATHETER: CPT

## 2025-07-01 RX ORDER — OXYCODONE HYDROCHLORIDE 5 MG/1
10 TABLET ORAL EVERY 4 HOURS PRN
Status: DISCONTINUED | OUTPATIENT
Start: 2025-07-01 | End: 2025-07-02 | Stop reason: HOSPADM

## 2025-07-01 RX ORDER — METHYLERGONOVINE MALEATE 0.2 MG/ML
200 INJECTION INTRAVENOUS PRN
Status: DISCONTINUED | OUTPATIENT
Start: 2025-07-01 | End: 2025-07-02

## 2025-07-01 RX ORDER — SODIUM CHLORIDE, SODIUM LACTATE, POTASSIUM CHLORIDE, CALCIUM CHLORIDE 600; 310; 30; 20 MG/100ML; MG/100ML; MG/100ML; MG/100ML
INJECTION, SOLUTION INTRAVENOUS CONTINUOUS
Status: DISCONTINUED | OUTPATIENT
Start: 2025-07-01 | End: 2025-07-02 | Stop reason: HOSPADM

## 2025-07-01 RX ORDER — IBUPROFEN 800 MG/1
800 TABLET, FILM COATED ORAL EVERY 8 HOURS SCHEDULED
Status: DISCONTINUED | OUTPATIENT
Start: 2025-07-01 | End: 2025-07-02 | Stop reason: HOSPADM

## 2025-07-01 RX ORDER — ONDANSETRON 2 MG/ML
4 INJECTION INTRAMUSCULAR; INTRAVENOUS EVERY 6 HOURS PRN
Status: DISCONTINUED | OUTPATIENT
Start: 2025-07-01 | End: 2025-07-02 | Stop reason: HOSPADM

## 2025-07-01 RX ORDER — ACETAMINOPHEN 500 MG
1000 TABLET ORAL EVERY 8 HOURS SCHEDULED
Status: DISCONTINUED | OUTPATIENT
Start: 2025-07-01 | End: 2025-07-02 | Stop reason: HOSPADM

## 2025-07-01 RX ORDER — DOCUSATE SODIUM 100 MG/1
100 CAPSULE, LIQUID FILLED ORAL 2 TIMES DAILY
Status: DISCONTINUED | OUTPATIENT
Start: 2025-07-01 | End: 2025-07-02 | Stop reason: HOSPADM

## 2025-07-01 RX ORDER — IBUPROFEN 800 MG/1
800 TABLET, FILM COATED ORAL EVERY 8 HOURS SCHEDULED
Status: DISCONTINUED | OUTPATIENT
Start: 2025-07-01 | End: 2025-07-01

## 2025-07-01 RX ORDER — SIMETHICONE 80 MG
80 TABLET,CHEWABLE ORAL EVERY 6 HOURS PRN
Status: DISCONTINUED | OUTPATIENT
Start: 2025-07-01 | End: 2025-07-02 | Stop reason: HOSPADM

## 2025-07-01 RX ORDER — PRENATAL WITH FERROUS FUM AND FOLIC ACID 3080; 920; 120; 400; 22; 1.84; 3; 20; 10; 1; 12; 200; 27; 25; 2 [IU]/1; [IU]/1; MG/1; [IU]/1; MG/1; MG/1; MG/1; MG/1; MG/1; MG/1; UG/1; MG/1; MG/1; MG/1; MG/1
1 TABLET ORAL DAILY
Status: DISCONTINUED | OUTPATIENT
Start: 2025-07-01 | End: 2025-07-02 | Stop reason: HOSPADM

## 2025-07-01 RX ORDER — SODIUM CHLORIDE 0.9 % (FLUSH) 0.9 %
5-40 SYRINGE (ML) INJECTION PRN
Status: DISCONTINUED | OUTPATIENT
Start: 2025-07-01 | End: 2025-07-02

## 2025-07-01 RX ORDER — SODIUM CHLORIDE 0.9 % (FLUSH) 0.9 %
5-40 SYRINGE (ML) INJECTION EVERY 12 HOURS SCHEDULED
Status: DISCONTINUED | OUTPATIENT
Start: 2025-07-01 | End: 2025-07-02

## 2025-07-01 RX ORDER — ONDANSETRON 4 MG/1
4 TABLET, ORALLY DISINTEGRATING ORAL EVERY 6 HOURS PRN
Status: DISCONTINUED | OUTPATIENT
Start: 2025-07-01 | End: 2025-07-02 | Stop reason: HOSPADM

## 2025-07-01 RX ORDER — FERROUS SULFATE 325(65) MG
325 TABLET ORAL EVERY OTHER DAY
Status: DISCONTINUED | OUTPATIENT
Start: 2025-07-01 | End: 2025-07-02 | Stop reason: HOSPADM

## 2025-07-01 RX ORDER — OXYCODONE HYDROCHLORIDE 5 MG/1
5 TABLET ORAL EVERY 4 HOURS PRN
Status: DISCONTINUED | OUTPATIENT
Start: 2025-07-01 | End: 2025-07-02 | Stop reason: HOSPADM

## 2025-07-01 RX ORDER — FAMOTIDINE 20 MG/1
20 TABLET, FILM COATED ORAL 2 TIMES DAILY
Status: DISCONTINUED | OUTPATIENT
Start: 2025-07-01 | End: 2025-07-02 | Stop reason: HOSPADM

## 2025-07-01 RX ORDER — TRANEXAMIC ACID 10 MG/ML
1000 INJECTION, SOLUTION INTRAVENOUS
Status: DISCONTINUED | OUTPATIENT
Start: 2025-07-01 | End: 2025-07-02 | Stop reason: HOSPADM

## 2025-07-01 RX ORDER — SODIUM CHLORIDE 9 MG/ML
INJECTION, SOLUTION INTRAVENOUS PRN
Status: DISCONTINUED | OUTPATIENT
Start: 2025-07-01 | End: 2025-07-02 | Stop reason: HOSPADM

## 2025-07-01 RX ORDER — MISOPROSTOL 100 UG/1
800 TABLET ORAL PRN
Status: DISCONTINUED | OUTPATIENT
Start: 2025-07-01 | End: 2025-07-02

## 2025-07-01 RX ADMIN — ACETAMINOPHEN 1000 MG: 500 TABLET ORAL at 08:58

## 2025-07-01 RX ADMIN — MOXIFLOXACIN HYDROCHLORIDE 800 MG: 400 TABLET, FILM COATED ORAL at 19:32

## 2025-07-01 RX ADMIN — DOCUSATE SODIUM 100 MG: 100 CAPSULE, LIQUID FILLED ORAL at 08:58

## 2025-07-01 RX ADMIN — PRENATAL WITH FERROUS FUM AND FOLIC ACID 1 TABLET: 3080; 920; 120; 400; 22; 1.84; 3; 20; 10; 1; 12; 200; 27; 25; 2 TABLET ORAL at 08:58

## 2025-07-01 RX ADMIN — DOCUSATE SODIUM 100 MG: 100 CAPSULE, LIQUID FILLED ORAL at 22:58

## 2025-07-01 RX ADMIN — Medication 166.7 ML: at 02:01

## 2025-07-01 RX ADMIN — ONDANSETRON 4 MG: 2 INJECTION, SOLUTION INTRAMUSCULAR; INTRAVENOUS at 01:39

## 2025-07-01 RX ADMIN — MOXIFLOXACIN HYDROCHLORIDE 800 MG: 400 TABLET, FILM COATED ORAL at 04:38

## 2025-07-01 RX ADMIN — ACETAMINOPHEN 1000 MG: 500 TABLET ORAL at 22:58

## 2025-07-01 ASSESSMENT — PAIN DESCRIPTION - LOCATION
LOCATION: PERINEUM
LOCATION: PERINEUM;RECTUM
LOCATION: PERINEUM

## 2025-07-01 ASSESSMENT — PAIN DESCRIPTION - DESCRIPTORS
DESCRIPTORS: DISCOMFORT;PRESSURE
DESCRIPTORS: SORE

## 2025-07-01 ASSESSMENT — PAIN SCALES - GENERAL
PAINLEVEL_OUTOF10: 5
PAINLEVEL_OUTOF10: 7
PAINLEVEL_OUTOF10: 7

## 2025-07-01 ASSESSMENT — PAIN - FUNCTIONAL ASSESSMENT
PAIN_FUNCTIONAL_ASSESSMENT: ACTIVITIES ARE NOT PREVENTED

## 2025-07-01 NOTE — PLAN OF CARE
Problem: Pain  Goal: Verbalizes/displays adequate comfort level or baseline comfort level  2025 120 by Pari Partida RN  Outcome: Progressing  2025 0243 by Cecilia Dumont RN  Outcome: Progressing     Problem: Safety - Adult  Goal: Free from fall injury  2025 120 by Pari Partida RN  Outcome: Progressing  2025 by Cecilia Dumont, RN  Outcome: Progressing     Problem: Postpartum  Goal: Experiences normal postpartum course  Description:  Postpartum OB-Pregnancy care plan goal which identifies if the mother is experiencing a normal postpartum course  Outcome: Progressing  Goal: Appropriate maternal -  bonding  Description:  Postpartum OB-Pregnancy care plan goal which identifies if the mother and  are bonding appropriately  Outcome: Progressing  Goal: Establishment of infant feeding pattern  Description:  Postpartum OB-Pregnancy care plan goal which identifies if the mother is establishing a feeding pattern with their   Outcome: Progressing  Goal: Incisions, wounds, or drain sites healing without S/S of infection  Outcome: Progressing     Problem: Infection - Adult  Goal: Absence of infection at discharge  Outcome: Progressing  Goal: Absence of infection during hospitalization  Outcome: Progressing  Goal: Absence of fever/infection during anticipated neutropenic period  Outcome: Progressing     Problem: Discharge Planning  Goal: Discharge to home or other facility with appropriate resources  Outcome: Progressing

## 2025-07-01 NOTE — PROGRESS NOTES
Department of Obstetrics and Gynecology  Labor and Delivery  Resident Post Partum Progress Note      SUBJECTIVE:  This is a 21 y/o  who presents in Room 377 delivered via  @ 01:56 on 2025.    Postpartum Day: 0     Complications:  2nd degree midline laceration with successful repair. EBL: 100mL    Family present at bedside. Mother reports general well-being. Denies depression or trauma symptoms.     Pain level decreasing since delivery    Denies bleeding/ lochia.   Denies BM or incontinence.     Contraception to be discussed.     Lactation: Initiated formula feeding, attaching well.     Social support: Assessed by Nursing     OBJECTIVE:      Vitals:  /62   Pulse 85   Temp 97.8 °F (36.6 °C) (Oral)   Resp 16   Ht 1.575 m (5' 2\")   Wt 84.7 kg (186 lb 11.2 oz)   LMP 2024 (Approximate)   SpO2 98%   Breastfeeding Unknown   BMI 34.15 kg/m²     GENERAL: well appearing, no acute distress   LUNGS:  no increased work of breathing and clear to auscultation  CARDIOVASCULAR:  Normal apical impulse, regular rate and rhythm, normal S1 and S2, no S3 or S4, and no murmur noted      ASSESSMENT & PLAN:      Principal Problem:  1)  (spontaneous vaginal delivery)  Plan: Monitor for stability, anticipate discharge      Active Problems:  2) Premature rupture of membranes (PROM), onset of labor within 24 hours, antepartum, full term  Plan: Successful  @ 01:56 on 2025.     3) Obesity affecting pregnancy in third trimester

## 2025-07-01 NOTE — PROGRESS NOTES
MOB and infant assessments deferred for pt, infant and fob sleeping at this time. This RN will continue to monitor.

## 2025-07-01 NOTE — PLAN OF CARE
Problem: Vaginal Birth or  Section  Goal: Fetal and maternal status remain reassuring during the birth process  Description:  Birth OB-Pregnancy care plan goal which identifies if the fetal and maternal status remain reassuring during the birth process  2025 by Cecilia Dumont RN  Outcome: Completed  2025 by Cecilia Dumont RN  Outcome: Progressing     Problem: Safety - Adult  Goal: Free from fall injury  2025 by Cecilia Dumont RN  Outcome: Progressing  2025 by Cecilia Dumont RN  Outcome: Progressing     Problem: Pain  Goal: Verbalizes/displays adequate comfort level or baseline comfort level  2025 by Cecilia Dumont RN  Outcome: Progressing  2025 by Cecilia Dumont RN  Outcome: Progressing

## 2025-07-01 NOTE — L&D DELIVERY NOTE
Department of Obstetrics and Gynecology  Spontaneous Vaginal Delivery Note      Pre-operative Diagnosis:  Term pregnancy and Pregnancy complicated by: obesity, excessive weight gain, nicotine usage, anxiety    Post-operative Diagnosis:  Living  infant(s) and Male    Information for the patient's :  Chaim Ackerman [[id number]]                  Infant Wt:   Information for the patient's :  Chaim Ackerman [[id number]]         APGARS:     Information for the patient's :  Chaim Ackerman [[id number]]         Anesthesia:  epidural anesthesia    Application and Delivery:    Patient was admitted following SROM - clear fluid at 04:44 on 25, pitocin was started, she received an epidural and steadily progressed to completed. She pushed for approximately one hour and then was set up for delivery.   She was placed in the dorsal lithotomy position, prepped and draped in the normal sterile fashion. She pushed an additional four times  minutes and there was delivery of the head in the SARAH position, a loose nuchal cord was reduced, followed by the posterior & then anterior shoulder. The remainder of the infant delivered without difficulty. Infant was placed on mother's abdomen. The cord was doubly clamped. The father of the baby cut the cord.  A segment of the cord was obtained.  The 3 VC intact placenta spontaneously delivered. Fundal massage was performed until the fundus was firm.  Inspection of the perineum revealed a second degree midline laceration which was repaired, with 2.0 Vicryl, in the normal sterile fashion.  Hemostasis was noted.  No lacerations were noted on the cervix and vagina.  Once again, hemostasis was noted.   Minimal bleeding was noted from the cervix and the fundus was firm.    Mother and infant were stable in recovery.  Sponge and needle counts were correct before and following the procedure.     EBL:   100 ml      Delivery Summary:  Labor & Delivery

## 2025-07-02 VITALS
SYSTOLIC BLOOD PRESSURE: 120 MMHG | WEIGHT: 186.7 LBS | OXYGEN SATURATION: 99 % | RESPIRATION RATE: 18 BRPM | TEMPERATURE: 98.2 F | DIASTOLIC BLOOD PRESSURE: 71 MMHG | BODY MASS INDEX: 34.36 KG/M2 | HEIGHT: 62 IN | HEART RATE: 72 BPM

## 2025-07-02 LAB
DEPRECATED RDW RBC AUTO: 14.1 % (ref 12.4–15.4)
HCT VFR BLD AUTO: 29.9 % (ref 36–48)
HGB BLD-MCNC: 10.2 G/DL (ref 12–16)
MCH RBC QN AUTO: 30.5 PG (ref 26–34)
MCHC RBC AUTO-ENTMCNC: 33.9 G/DL (ref 31–36)
MCV RBC AUTO: 90 FL (ref 80–100)
PLATELET # BLD AUTO: 173 K/UL (ref 135–450)
PMV BLD AUTO: 9.9 FL (ref 5–10.5)
RBC # BLD AUTO: 3.33 M/UL (ref 4–5.2)
WBC # BLD AUTO: 12 K/UL (ref 4–11)

## 2025-07-02 PROCEDURE — 85027 COMPLETE CBC AUTOMATED: CPT

## 2025-07-02 PROCEDURE — 36415 COLL VENOUS BLD VENIPUNCTURE: CPT

## 2025-07-02 PROCEDURE — 6370000000 HC RX 637 (ALT 250 FOR IP): Performed by: OBSTETRICS & GYNECOLOGY

## 2025-07-02 RX ORDER — ACETAMINOPHEN 325 MG/1
650 TABLET ORAL EVERY 6 HOURS
Qty: 120 TABLET | Refills: 0 | Status: SHIPPED | OUTPATIENT
Start: 2025-07-02 | End: 2025-07-17

## 2025-07-02 RX ORDER — IBUPROFEN 600 MG/1
600 TABLET, FILM COATED ORAL EVERY 6 HOURS
Qty: 60 TABLET | Refills: 0 | Status: SHIPPED | OUTPATIENT
Start: 2025-07-02 | End: 2025-07-17

## 2025-07-02 RX ADMIN — DOCUSATE SODIUM 100 MG: 100 CAPSULE, LIQUID FILLED ORAL at 08:05

## 2025-07-02 RX ADMIN — OXYCODONE 10 MG: 5 TABLET ORAL at 15:35

## 2025-07-02 RX ADMIN — ACETAMINOPHEN 1000 MG: 500 TABLET ORAL at 08:02

## 2025-07-02 RX ADMIN — PRENATAL WITH FERROUS FUM AND FOLIC ACID 1 TABLET: 3080; 920; 120; 400; 22; 1.84; 3; 20; 10; 1; 12; 200; 27; 25; 2 TABLET ORAL at 08:05

## 2025-07-02 RX ADMIN — ONDANSETRON 4 MG: 4 TABLET, ORALLY DISINTEGRATING ORAL at 15:35

## 2025-07-02 RX ADMIN — MOXIFLOXACIN HYDROCHLORIDE 800 MG: 400 TABLET, FILM COATED ORAL at 15:35

## 2025-07-02 ASSESSMENT — PAIN SCALES - GENERAL: PAINLEVEL_OUTOF10: 7

## 2025-07-02 NOTE — ANESTHESIA POSTPROCEDURE EVALUATION
Department of Anesthesiology  Postprocedure Note    Patient: Ana Ackerman  MRN: [id number]  YOB: 2005  Date of evaluation: 7/2/2025    Procedure Summary       Date: 06/30/25 Room / Location:     Anesthesia Start: 1802 Anesthesia Stop: 07/01/25 0156    Procedure: Labor Analgesia Diagnosis:     Scheduled Providers:  Responsible Provider: Sanket Guerin MD    Anesthesia Type: CSE ASA Status: 2            Anesthesia Type: No value filed.    Chuy Phase I:      Chuy Phase II:      Anesthesia Post Evaluation    Level of consciousness: awake  Cardiovascular status: hemodynamically stable  Respiratory status: acceptable  Comments: No apparent complications from neuraxial anesthesia.  Pain management: adequate        No notable events documented.

## 2025-07-02 NOTE — DISCHARGE SUMMARY
Obstetrical Discharge Summary  Hospital: [facility]     OB Provider Group: [facility]     Date of Delivery: 25     Delivered By: Amber Burleson DO     Discharge Date: 25     Antepartum problem list: excessive weight gain, nicotine usage, anxiety      Anesthesia: Epidural     Intrapartum/Postpartum significant events: None     Laceration: 2nd degree perineal      Subjective:  20 y.o. yo   PPD#1 s/p . Pain is controlled.  Lochia is light.  Tolerating po, ambulating, voiding without difficulty.  Pt desires discharge home today. Denies headache, vision changes, shortness of breath, nausea, vomiting, chest pain, or RUQ pain.       Vitals:  /73   Pulse 64   Temp 98.1 °F (36.7 °C) (Oral)   Resp 16   Ht 1.575 m (5' 2\")   Wt 84.7 kg (186 lb 11.2 oz)   LMP 2024 (Approximate)   SpO2 99%   Breastfeeding Unknown   BMI 34.15 kg/m²         CONSTITUTIONAL:  awake, alert, cooperative, no apparent distress, and appears stated age  ABDOMEN:  nontender, fundus below umbilicus  CARDIAC: RRR  PULM:  no increased work of breathing, CTAB  MUSCULOSKELETAL:  nontender legs, no edema  PAD: Normal lochia      Recent Labs     25  0609   WBC 12.0*   RBC 3.33*   HGB 10.2*   HCT 29.9*   MCV 90.0   RDW 14.1           DISCHARGE DIAGNOSES:    S/p    Data  Information for the patient's :  Chaim Ackerman [[id number]]   male   Birth Weight: 3.345 kg (7 lb 6 oz)  Discharge With Mother  Complications: No  APGARS: 9/9    Consented for elective circumcision for her male infant. Risks of bleeding, infection, damage to surrounding structures, penile disfigurement and sexual dysfunction explained to the patient. She voiced and understanding and desires to proceed with her son being circumcised.      DISCHARGE CONDITION: Stable     Discharge Information     Medication List        START taking these medications      acetaminophen 325 MG tablet  Commonly known as:

## 2025-07-02 NOTE — PLAN OF CARE
Problem: Pain  Goal: Verbalizes/displays adequate comfort level or baseline comfort level  2025 1245 by Margoth Pacheco RN  Outcome: Adequate for Discharge  2025 by Cecilia Dumont RN  Outcome: Progressing     Problem: Safety - Adult  Goal: Free from fall injury  Outcome: Adequate for Discharge     Problem: Postpartum  Goal: Experiences normal postpartum course  Description:  Postpartum OB-Pregnancy care plan goal which identifies if the mother is experiencing a normal postpartum course  2025 1245 by Margoth Pacheco RN  Outcome: Adequate for Discharge  2025 by Cecilia Dumont RN  Outcome: Progressing  Goal: Appropriate maternal -  bonding  Description:  Postpartum OB-Pregnancy care plan goal which identifies if the mother and  are bonding appropriately  2025 124 by Margoth Pacheco RN  Outcome: Adequate for Discharge  2025 by Cecilia Dumont RN  Outcome: Progressing  Goal: Establishment of infant feeding pattern  Description:  Postpartum OB-Pregnancy care plan goal which identifies if the mother is establishing a feeding pattern with their   2025 1245 by Margoth Pacheco RN  Outcome: Adequate for Discharge  2025 by Cecilia Dumont RN  Outcome: Progressing  Goal: Incisions, wounds, or drain sites healing without S/S of infection  2025 124 by Margoth Pacheco RN  Outcome: Adequate for Discharge  2025 by Cecilia Dumont RN  Outcome: Progressing     Problem: Infection - Adult  Goal: Absence of infection at discharge  Outcome: Adequate for Discharge  Goal: Absence of infection during hospitalization  Outcome: Adequate for Discharge  Goal: Absence of fever/infection during anticipated neutropenic period  Outcome: Adequate for Discharge     Problem: Discharge Planning  Goal: Discharge to home or other facility with appropriate resources  2025 1245 by Margoth Pacheco RN  Outcome: Adequate for

## 2025-07-02 NOTE — PLAN OF CARE
Problem: Postpartum  Goal: Experiences normal postpartum course  Description:  Postpartum OB-Pregnancy care plan goal which identifies if the mother is experiencing a normal postpartum course  2025 by Cecilia Dumont RN  Outcome: Progressing  2025 by Pari Partida RN  Outcome: Progressing  Goal: Appropriate maternal -  bonding  Description:  Postpartum OB-Pregnancy care plan goal which identifies if the mother and  are bonding appropriately  2025 by Cecilia Dumont RN  Outcome: Progressing  2025 120 by Pari Partida RN  Outcome: Progressing  Goal: Establishment of infant feeding pattern  Description:  Postpartum OB-Pregnancy care plan goal which identifies if the mother is establishing a feeding pattern with their   2025 by Cecilia Dumont RN  Outcome: Progressing  2025 by Pari Partida RN  Outcome: Progressing  Goal: Incisions, wounds, or drain sites healing without S/S of infection  2025 by Cecilia Dumont RN  Outcome: Progressing  2025 by Pari Partida RN  Outcome: Progressing

## 2025-07-02 NOTE — FLOWSHEET NOTE
06/30/25 2102   Cervical Exam   Dilation (cm) 4  (4-5)   Effacement 80   Station -1   Station (Labor Curve Graph) 6   OB Examiner S Julia Burleson updated.   
   07/01/25 0040   Cervical Exam   Dilation (cm) 10   Dilation Complete Date 07/01/25   Dilation Complete Time 0040   Effacement 100   Station +2   Station (Labor Curve Graph) 3   OB Examiner S Julia Burleson updated.  
Postpartum and infant care teaching completed and forms signed by patient. Copy witnessed by RN and given to patient. Patient verbalized understanding of all teaching points. Prescriptions given if applicable. Patient plans to follow-up with OB Provider as instructed.  Home visit planned. Patient verbalizes understanding of discharge instructions and denies further questions.  ID bands checked. Mother's ID band and one of baby's ID bands removed and taped to discharge instruction sheet, signed by patient and witnessed by RN. Patient discharged in stable condition accompanied by family/guardian. Discharged in wheelchair, holding baby in arms.  
their discharge booklet they can take.  13. Do you have any other questions or concerns I can address today? Y/N  ______________      _________________________________________________________________________    Information provided during call :_________________________________________________  ___________________________________________________________________________    Call completed by:____________________________    Date:_________ Time:___________

## 2025-07-02 NOTE — DISCHARGE INSTRUCTIONS
Thank you for the opportunity to care for you and your family.    We hope that you are happy with the care we provided during your stay in the [facility]. We want to ensure that you have the help you need when you leave the hospital. If there is anything we can assist you with, please let us know.    Breastfeeding mothers may contact our lactation specialists with any problems or questions.  [facility] lact[facility] services phone number is [phone].  Leave a message and your call will be returned.    Please refer to the information provided in the postpartum care booklet.  The following are warning signs to remember.    Call 911 if you have:    Chest pain or pressure  Shortness of breath, even at rest  Thoughts of harming yourself or others  Seizures    Call your healthcare provider if you have:    Temperature of 100.4 degrees or higher  Stitches that are not healing        -- Swelling, bleeding, drainage, foul odor, redness or warmth in/around your           stitches, staples, or incision (scar)        -- Bad smelling blood or discharge from the vagina  Vaginal bleeding that has increased         -- Soaking through one pad in an hour        -- You are passing clots larger than the size of a lemon  Red, warm tender area(s) in your breast or calf  Headache that does not get better, even after taking medicine; or headache with vision changes    Remember to notify all healthcare providers from your date of delivery to up to one year after giving birth!    CARING FOR YOURSELF        DIET/ACTIVITY    Eat a well balanced diet focusing on foods high in fiber and protein.  Drink plenty of fluids, especially water.  To avoid constipation you may take a mild stool softener as recommended by your doctor or midwife.  Gradually increase your activity. Resume an exercise regime only after being advised by your doctor or midwife.  When sitting or lying down, keep your legs elevated to reduce swelling.  Avoid